# Patient Record
Sex: FEMALE | Race: WHITE | NOT HISPANIC OR LATINO | Employment: STUDENT | URBAN - METROPOLITAN AREA
[De-identification: names, ages, dates, MRNs, and addresses within clinical notes are randomized per-mention and may not be internally consistent; named-entity substitution may affect disease eponyms.]

---

## 2021-12-10 PROCEDURE — U0004 COV-19 TEST NON-CDC HGH THRU: HCPCS | Performed by: FAMILY MEDICINE

## 2021-12-12 ENCOUNTER — TELEPHONE (OUTPATIENT)
Dept: URGENT CARE | Facility: CLINIC | Age: 21
End: 2021-12-12

## 2021-12-12 NOTE — TELEPHONE ENCOUNTER
----- Message from Corrine Erickson DNP, APRN sent at 12/12/2021  8:54 AM EST -----  All negative covid     ----- Message -----  From: Lab, Background User  Sent: 12/11/2021   5:23 PM EST  To:  [address] Covid Results

## 2021-12-15 ENCOUNTER — TELEPHONE (OUTPATIENT)
Dept: URGENT CARE | Facility: CLINIC | Age: 21
End: 2021-12-15

## 2021-12-15 NOTE — TELEPHONE ENCOUNTER
12/15/2021 called and verified patients birth date, and informed her that her covid test was negative.  She said she is feeling better, and some one else had already called and gave her the results.  pal